# Patient Record
Sex: MALE | Race: WHITE | URBAN - METROPOLITAN AREA
[De-identification: names, ages, dates, MRNs, and addresses within clinical notes are randomized per-mention and may not be internally consistent; named-entity substitution may affect disease eponyms.]

---

## 2018-07-17 ENCOUNTER — TRANSFERRED RECORDS (OUTPATIENT)
Dept: HEALTH INFORMATION MANAGEMENT | Facility: CLINIC | Age: 68
End: 2018-07-17

## 2019-05-13 ENCOUNTER — MEDICAL CORRESPONDENCE (OUTPATIENT)
Dept: HEALTH INFORMATION MANAGEMENT | Facility: CLINIC | Age: 69
End: 2019-05-13

## 2019-05-13 ENCOUNTER — TRANSFERRED RECORDS (OUTPATIENT)
Dept: HEALTH INFORMATION MANAGEMENT | Facility: CLINIC | Age: 69
End: 2019-05-13

## 2019-05-15 ENCOUNTER — TRANSFERRED RECORDS (OUTPATIENT)
Dept: HEALTH INFORMATION MANAGEMENT | Facility: CLINIC | Age: 69
End: 2019-05-15

## 2019-05-27 ENCOUNTER — TRANSFERRED RECORDS (OUTPATIENT)
Dept: HEALTH INFORMATION MANAGEMENT | Facility: CLINIC | Age: 69
End: 2019-05-27

## 2019-05-28 ENCOUNTER — TELEPHONE (OUTPATIENT)
Dept: OPHTHALMOLOGY | Facility: CLINIC | Age: 69
End: 2019-05-28

## 2019-05-28 NOTE — TELEPHONE ENCOUNTER
NPI for Dr. Garcia provided to [facility]/clinic  Lam Naylor RN 12:28 PM 05/28/19       Health Call Center    Phone Message    May a detailed message be left on voicemail: yes    Reason for Call: Other: Diana is needing Toy Leavitt NPI# for billing purpose. Please call her back with the NPI#, thank you     Action Taken: Message routed to:  [facility] ([facility] Eye

## 2019-06-13 ENCOUNTER — TRANSFERRED RECORDS (OUTPATIENT)
Dept: HEALTH INFORMATION MANAGEMENT | Facility: CLINIC | Age: 69
End: 2019-06-13

## 2019-06-26 ENCOUNTER — OFFICE VISIT (OUTPATIENT)
Dept: OPHTHALMOLOGY | Facility: CLINIC | Age: 69
End: 2019-06-26
Attending: OPHTHALMOLOGY
Payer: COMMERCIAL

## 2019-06-26 DIAGNOSIS — H53.10 SUBJECTIVE VISUAL DISTURBANCE: ICD-10-CM

## 2019-06-26 DIAGNOSIS — H50.05 ESOTROPIA, ALTERNATING: Primary | ICD-10-CM

## 2019-06-26 DIAGNOSIS — H53.2 DOUBLE VISION: ICD-10-CM

## 2019-06-26 PROCEDURE — 92060 SENSORIMOTOR EXAMINATION: CPT | Mod: ZF | Performed by: OPHTHALMOLOGY

## 2019-06-26 PROCEDURE — G0463 HOSPITAL OUTPT CLINIC VISIT: HCPCS | Mod: ZF

## 2019-06-26 ASSESSMENT — EXTERNAL EXAM - RIGHT EYE: OD_EXAM: NORMAL

## 2019-06-26 ASSESSMENT — REFRACTION_WEARINGRX
OS_CYLINDER: +2.50
OS_SPHERE: +2.00
OD_ADD: +2.25
OD_AXIS: 166
OS_AXIS: 165
OS_HPRISM: 9 BO
OD_CYLINDER: +2.25
OS_ADD: +2.25
OD_HPRISM: 9 BO
OD_SPHERE: +2.50

## 2019-06-26 ASSESSMENT — VISUAL ACUITY
OS_CC: 20/20
OS_CC+: -1
OD_CC: 20/20
CORRECTION_TYPE: GLASSES
METHOD: SNELLEN - LINEAR

## 2019-06-26 ASSESSMENT — CUP TO DISC RATIO
OD_RATIO: 0.25
OS_RATIO: 0.25

## 2019-06-26 ASSESSMENT — TONOMETRY
IOP_METHOD: ICARE
OS_IOP_MMHG: 15
OD_IOP_MMHG: 16

## 2019-06-26 ASSESSMENT — CONF VISUAL FIELD
OD_NORMAL: 1
OS_NORMAL: 1

## 2019-06-26 ASSESSMENT — EXTERNAL EXAM - LEFT EYE: OS_EXAM: NORMAL

## 2019-06-26 NOTE — LETTER
2019     Michael Murray MD    RE: Abdifatah Gold  : 1950  MRN: [id number]    Dear Dr. Murray:    Thank you for referring your patient, Abdifatah Das, to [facility] clinic recently.  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 Discussed the risks, benefits, and alternatives of strabismus surgery and patient wishes to proceed.    Abdifatah Gold is a 68-year-old male [facility] for progressive binocular horizontal diplopia due to esotropia. Onset was . Worse at distance than near and worse later in day/when tired. Has had increasing prism requirements, currently tolerating 18 total LEOBARDO (9 each eye) in current spectacles.    PMH includes HLD and CAD (NSTEMI) status post stent. On lipitor and metoprolol and baby aspirin once daily.  Non-smoker.    [facility] brain with [facility] ([facility], [facility])  1. Age-appropriate involutional changes; trace pontine chronic microvascular disease on the left.  Focus of right frontal operculum compatible with cavernoma.  In proper clinical setting amyloid angiopathy and other hemorrhagic lesions could have similar appearance.  No intracranial lesions.    On exam, patient is 20/20 each eye with briskly reactive pupils without RAPD. IOP, CVF, and color plates all normal. With trial frames without prisms patient has a comitant esotropia of 25 PD at distance and an ET of 16 PD at near. Anterior segment exam is notable for a deep sulcus and mild cataracts bilaterally. Posterior segment exam is unremarkable.    In summary, Mr. Gold has a comitant esotropia with progressive prism requirements over the past 5 years. 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 Due to intolerable prism requirements, strabismus surgery is a very reasonable  option in this case.  The patient is highly motivated for strabismus surgery to reduce or potentially eliminate his prism requirement.     We discussed in detail the risks, benefits, and alternatives of eye muscle correction surgery including the very rare risk of death or serious morbidity from a general anesthesia complication and the rare risk of severe vision loss in the operative eye(s) secondary to retinal detachment or endophthalmitis.  We discussed more likely sub-optimal outcomes including the unanticipated need for additional strabismus surgery.  Finally the patient was aware that prisms glasses may be required to optimize single vision following surgery.    After a thorough discussion of these risks, the patient decided to proceed with strabismus surgery.  The surgical plan is as follows:     1. Bilateral lateral rectus resections - one or both on adjustable suture     Follow-up 1 week after strabismus surgery    Plan [facility]  Prism-free glasses for adjustment: [facility]    Again, thank you for trusting me with the care of your patient.  For further exam details, please feel free to contact our office for additional records.  If you wish to contact me regarding this patient please email me at [email or web address] or give my clinic a call to arrange a phone conversation.    Sincerely,    Toy Martinez MD  , Neuro-Ophthalmology and Adult Strabismus Surgery  The Zita VALE and Tammi Stubbs Chair in Neuro-Ophthalmology  Department of Ophthalmology and Visual Neurosciences  [facility]        CC:  [facility]

## 2019-06-26 NOTE — PROGRESS NOTES
1. 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 Discussed the risks, benefits, and alternatives of strabismus surgery and patient wishes to proceed.    Abdifatah Gold is a 68 year old male [facility] ([facility]) for progressive binocular horizontal diplopia due to esotropia. Onset was 2011. Worse at distance than near and worse later in day/when tired. Has had increasing prism requirements, currently tolerating 18 total LEOBARDO (9 each eye) in current spectacles.    PMH includes HLD and CAD (NSTEMI) status post stent. On lipitor and metoprolol and baby aspirin once daily.  Non-smoker.    MRI brain with and without contrast 6/13/19 ([facility])  1. Age-appropriate involutional changes; trace pontine chronic microvascular disease on the left  Focus of right frontal operculum compatible with cavernoma  In proper clinical setting amyloid angiopathy and other hemorrhagic lesions could have similar appearance  No intracranial lesions.    On exam, patient is 20/20 each eye with briskly reactive pupils without RAPD. IOP, CVF, and color plates all normal. With trial frames without prisms patient has a comitant esotropia of 25 PD at distance and an ET of 16 PD at near. Anterior segment exam is notable for a deep sulcus and mild cataracts bilaterally. Posterior segment exam is unremarkable.    In summary, Mr. Gold has a comitant esotropia with progressive prism requirements over the past 5 years. 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 Due to intolerable prism requirements, strabismus surgery is a very reasonable option in this case.  The patient is highly motivated for strabismus surgery to reduce or potentially eliminate his prism requirement.     We discussed in detail the risks, benefits, and alternatives of eye muscle correction surgery including the very rare risk of death or serious morbidity from a general  anesthesia complication and the rare risk of severe vision loss in the operative eye(s) secondary to retinal detachment or endophthalmitis.  We discussed more likely sub-optimal outcomes including the unanticipated need for additional strabismus surgery.  Finally the patient was aware that prisms glasses may be required to optimize single vision following surgery.    After a thorough discussion of these risks, the patient decided to proceed with strabismus surgery.  The surgical plan is as follows:     1. Bilateral lateral rectus resections - one or both on adjustable suture     Follow-up 1 week after strabismus surgery.    [facility]: [facility]  Prism free glasses for adjustment: [facility]     Complete documentation of historical and exam elements from today's encounter can be found in the full encounter summary report (not reduplicated in this progress note).  I personally obtained the chief complaint(s) and history of present illness.  I confirmed and edited as necessary the review of systems, past medical/surgical history, family history, social history, and examination findings as documented by others; and I examined the patient myself.  I personally reviewed the relevant tests, images, and reports as documented above.  I formulated and edited as necessary the assessment and plan and discussed the findings and management plan with the patient and family.  I personally reviewed the ophthalmic test(s) associated with this encounter, agree with the interpretation(s) as documented by the resident/fellow, and have edited the corresponding report(s) as necessary.     MD Rehan House M.D.  PGY-3, Ophthalmology

## 2019-06-26 NOTE — NURSING NOTE
Chief Complaints and History of Present Illnesses   Patient presents with     Diplopia Evaluation     Chief Complaint(s) and History of Present Illness(es)     Diplopia Evaluation               Comments     Abdifatah Gold is a 68 year old male who presents today for  1. Progressive esotropia  Onset in 2011. Getting worse. Intermittent diplopia, only when tired. Worse at distance than near.     Currently wearing rx with inc prisms. Interested in surgery if necessary.   MR brain was unremarkable per patient account.     Myah ALBARRAN 12:32 PM June 26, 2019

## 2019-08-09 ENCOUNTER — TRANSFERRED RECORDS (OUTPATIENT)
Dept: HEALTH INFORMATION MANAGEMENT | Facility: CLINIC | Age: 69
End: 2019-08-09

## 2019-08-13 ENCOUNTER — TELEPHONE (OUTPATIENT)
Dept: OPHTHALMOLOGY | Facility: CLINIC | Age: 69
End: 2019-08-13

## 2019-08-13 NOTE — TELEPHONE ENCOUNTER
8/13/2019 2:33PM Advised that H&P has not been received in our office or in the [facility] office. [facility] [place] They will contact Dr. Henry's office and request H&P be faxed to [facility] office at [phone].    8/13/2019 2:11PM Ying marte H[facility] was completed by [facility]. They would like to confirm that it has been received for 8/13/2019 surgery.

## 2019-08-15 RX ORDER — MULTIVIT-MIN/IRON/FOLIC ACID/K 18-600-40
2 CAPSULE ORAL DAILY
COMMUNITY

## 2019-08-15 RX ORDER — ASPIRIN 81 MG/1
81 TABLET, CHEWABLE ORAL DAILY
COMMUNITY

## 2019-08-15 RX ORDER — MULTIPLE VITAMINS W/ MINERALS TAB 9MG-400MCG
1 TAB ORAL DAILY
COMMUNITY

## 2019-08-15 RX ORDER — SILDENAFIL 100 MG/1
100 TABLET, FILM COATED ORAL DAILY PRN
COMMUNITY

## 2019-08-15 RX ORDER — TRAMADOL HYDROCHLORIDE 50 MG/1
50 TABLET ORAL 2 TIMES DAILY PRN
COMMUNITY

## 2019-08-15 RX ORDER — ATORVASTATIN CALCIUM 80 MG/1
80 TABLET, FILM COATED ORAL AT BEDTIME
COMMUNITY

## 2019-08-15 RX ORDER — GABAPENTIN 300 MG/1
300 CAPSULE ORAL 3 TIMES DAILY
COMMUNITY

## 2019-08-15 SDOH — HEALTH STABILITY: MENTAL HEALTH: HOW OFTEN DO YOU HAVE A DRINK CONTAINING ALCOHOL?: NEVER

## 2019-08-16 ENCOUNTER — ANESTHESIA EVENT (OUTPATIENT)
Dept: SURGERY | Facility: AMBULATORY SURGERY CENTER | Age: 69
End: 2019-08-16

## 2019-08-19 ENCOUNTER — HOSPITAL ENCOUNTER (OUTPATIENT)
Facility: AMBULATORY SURGERY CENTER | Age: 69
End: 2019-08-19
Attending: OPHTHALMOLOGY

## 2019-08-19 ENCOUNTER — ANESTHESIA (OUTPATIENT)
Dept: SURGERY | Facility: AMBULATORY SURGERY CENTER | Age: 69
End: 2019-08-19

## 2019-08-19 VITALS
SYSTOLIC BLOOD PRESSURE: 125 MMHG | HEART RATE: 66 BPM | OXYGEN SATURATION: 99 % | RESPIRATION RATE: 16 BRPM | BODY MASS INDEX: 25.11 KG/M2 | WEIGHT: 160 LBS | TEMPERATURE: 97.8 F | HEIGHT: 67 IN | DIASTOLIC BLOOD PRESSURE: 80 MMHG

## 2019-08-19 DIAGNOSIS — Z98.890 STATUS POST EYE SURGERY: Primary | ICD-10-CM

## 2019-08-19 RX ORDER — PROPOFOL 10 MG/ML
INJECTION, EMULSION INTRAVENOUS CONTINUOUS PRN
Status: DISCONTINUED | OUTPATIENT
Start: 2019-08-19 | End: 2019-08-19

## 2019-08-19 RX ORDER — NALOXONE HYDROCHLORIDE 0.4 MG/ML
.1-.4 INJECTION, SOLUTION INTRAMUSCULAR; INTRAVENOUS; SUBCUTANEOUS
Status: DISCONTINUED | OUTPATIENT
Start: 2019-08-19 | End: 2019-08-20 | Stop reason: HOSPADM

## 2019-08-19 RX ORDER — SODIUM CHLORIDE, SODIUM LACTATE, POTASSIUM CHLORIDE, CALCIUM CHLORIDE 600; 310; 30; 20 MG/100ML; MG/100ML; MG/100ML; MG/100ML
INJECTION, SOLUTION INTRAVENOUS CONTINUOUS PRN
Status: DISCONTINUED | OUTPATIENT
Start: 2019-08-19 | End: 2019-08-19

## 2019-08-19 RX ORDER — FENTANYL CITRATE 50 UG/ML
25-50 INJECTION, SOLUTION INTRAMUSCULAR; INTRAVENOUS
Status: DISCONTINUED | OUTPATIENT
Start: 2019-08-19 | End: 2019-08-20 | Stop reason: HOSPADM

## 2019-08-19 RX ORDER — MEPERIDINE HYDROCHLORIDE 25 MG/ML
12.5 INJECTION INTRAMUSCULAR; INTRAVENOUS; SUBCUTANEOUS
Status: DISCONTINUED | OUTPATIENT
Start: 2019-08-19 | End: 2019-08-20 | Stop reason: HOSPADM

## 2019-08-19 RX ORDER — PREDNISOLONE ACETATE 10 MG/ML
1-2 SUSPENSION/ DROPS OPHTHALMIC 4 TIMES DAILY
Qty: 1 BOTTLE | Refills: 1 | Status: SHIPPED | OUTPATIENT
Start: 2019-08-19

## 2019-08-19 RX ORDER — OXYMETAZOLINE HYDROCHLORIDE 0.05 G/100ML
SPRAY NASAL PRN
Status: DISCONTINUED | OUTPATIENT
Start: 2019-08-19 | End: 2019-08-19 | Stop reason: HOSPADM

## 2019-08-19 RX ORDER — BALANCED SALT SOLUTION 6.4; .75; .48; .3; 3.9; 1.7 MG/ML; MG/ML; MG/ML; MG/ML; MG/ML; MG/ML
SOLUTION OPHTHALMIC PRN
Status: DISCONTINUED | OUTPATIENT
Start: 2019-08-19 | End: 2019-08-19 | Stop reason: HOSPADM

## 2019-08-19 RX ORDER — SODIUM CHLORIDE, SODIUM LACTATE, POTASSIUM CHLORIDE, CALCIUM CHLORIDE 600; 310; 30; 20 MG/100ML; MG/100ML; MG/100ML; MG/100ML
INJECTION, SOLUTION INTRAVENOUS CONTINUOUS
Status: DISCONTINUED | OUTPATIENT
Start: 2019-08-19 | End: 2019-08-20 | Stop reason: HOSPADM

## 2019-08-19 RX ORDER — ONDANSETRON 2 MG/ML
INJECTION INTRAMUSCULAR; INTRAVENOUS PRN
Status: DISCONTINUED | OUTPATIENT
Start: 2019-08-19 | End: 2019-08-19

## 2019-08-19 RX ORDER — OXYCODONE HYDROCHLORIDE 5 MG/1
5 TABLET ORAL EVERY 4 HOURS PRN
Status: DISCONTINUED | OUTPATIENT
Start: 2019-08-19 | End: 2019-08-20 | Stop reason: HOSPADM

## 2019-08-19 RX ORDER — DEXAMETHASONE SODIUM PHOSPHATE 4 MG/ML
INJECTION, SOLUTION INTRA-ARTICULAR; INTRALESIONAL; INTRAMUSCULAR; INTRAVENOUS; SOFT TISSUE PRN
Status: DISCONTINUED | OUTPATIENT
Start: 2019-08-19 | End: 2019-08-19

## 2019-08-19 RX ORDER — PROPOFOL 10 MG/ML
INJECTION, EMULSION INTRAVENOUS PRN
Status: DISCONTINUED | OUTPATIENT
Start: 2019-08-19 | End: 2019-08-19

## 2019-08-19 RX ORDER — LIDOCAINE HYDROCHLORIDE 20 MG/ML
INJECTION, SOLUTION INFILTRATION; PERINEURAL PRN
Status: DISCONTINUED | OUTPATIENT
Start: 2019-08-19 | End: 2019-08-19

## 2019-08-19 RX ORDER — ACETAMINOPHEN 325 MG/1
975 TABLET ORAL ONCE
Status: COMPLETED | OUTPATIENT
Start: 2019-08-19 | End: 2019-08-19

## 2019-08-19 RX ORDER — TETRACAINE HYDROCHLORIDE 5 MG/ML
1-2 SOLUTION OPHTHALMIC
Status: DISCONTINUED | OUTPATIENT
Start: 2019-08-19 | End: 2019-08-20 | Stop reason: HOSPADM

## 2019-08-19 RX ORDER — ONDANSETRON 2 MG/ML
4 INJECTION INTRAMUSCULAR; INTRAVENOUS EVERY 30 MIN PRN
Status: DISCONTINUED | OUTPATIENT
Start: 2019-08-19 | End: 2019-08-20 | Stop reason: HOSPADM

## 2019-08-19 RX ORDER — TETRACAINE HYDROCHLORIDE 5 MG/ML
1-2 SOLUTION OPHTHALMIC
Status: DISCONTINUED | OUTPATIENT
Start: 2019-08-19 | End: 2019-08-19

## 2019-08-19 RX ORDER — FENTANYL CITRATE 50 UG/ML
INJECTION, SOLUTION INTRAMUSCULAR; INTRAVENOUS PRN
Status: DISCONTINUED | OUTPATIENT
Start: 2019-08-19 | End: 2019-08-19

## 2019-08-19 RX ORDER — GABAPENTIN 100 MG/1
100 CAPSULE ORAL ONCE
Status: DISCONTINUED | OUTPATIENT
Start: 2019-08-19 | End: 2019-08-20 | Stop reason: HOSPADM

## 2019-08-19 RX ORDER — ONDANSETRON 4 MG/1
4 TABLET, ORALLY DISINTEGRATING ORAL EVERY 30 MIN PRN
Status: DISCONTINUED | OUTPATIENT
Start: 2019-08-19 | End: 2019-08-20 | Stop reason: HOSPADM

## 2019-08-19 RX ADMIN — DEXAMETHASONE SODIUM PHOSPHATE 4 MG: 4 INJECTION, SOLUTION INTRA-ARTICULAR; INTRALESIONAL; INTRAMUSCULAR; INTRAVENOUS; SOFT TISSUE at 07:28

## 2019-08-19 RX ADMIN — SODIUM CHLORIDE, SODIUM LACTATE, POTASSIUM CHLORIDE, CALCIUM CHLORIDE: 600; 310; 30; 20 INJECTION, SOLUTION INTRAVENOUS at 06:45

## 2019-08-19 RX ADMIN — PROPOFOL 200 MCG/KG/MIN: 10 INJECTION, EMULSION INTRAVENOUS at 07:23

## 2019-08-19 RX ADMIN — ACETAMINOPHEN 975 MG: 325 TABLET ORAL at 06:30

## 2019-08-19 RX ADMIN — TETRACAINE HYDROCHLORIDE 2 DROP: 5 SOLUTION OPHTHALMIC at 09:07

## 2019-08-19 RX ADMIN — SODIUM CHLORIDE, SODIUM LACTATE, POTASSIUM CHLORIDE, CALCIUM CHLORIDE: 600; 310; 30; 20 INJECTION, SOLUTION INTRAVENOUS at 06:30

## 2019-08-19 RX ADMIN — PROPOFOL 140 MG: 10 INJECTION, EMULSION INTRAVENOUS at 07:23

## 2019-08-19 RX ADMIN — LIDOCAINE HYDROCHLORIDE 70 MG: 20 INJECTION, SOLUTION INFILTRATION; PERINEURAL at 07:23

## 2019-08-19 RX ADMIN — FENTANYL CITRATE 50 MCG: 50 INJECTION, SOLUTION INTRAMUSCULAR; INTRAVENOUS at 07:23

## 2019-08-19 RX ADMIN — ONDANSETRON 4 MG: 2 INJECTION INTRAMUSCULAR; INTRAVENOUS at 08:05

## 2019-08-19 RX ADMIN — PROPOFOL 20 MG: 10 INJECTION, EMULSION INTRAVENOUS at 08:01

## 2019-08-19 ASSESSMENT — MIFFLIN-ST. JEOR: SCORE: 1449.39

## 2019-08-19 ASSESSMENT — LIFESTYLE VARIABLES: TOBACCO_USE: 1

## 2019-08-19 NOTE — ANESTHESIA CARE TRANSFER NOTE
Patient: Abdifatah Gold    Procedure(s):  Bilateral Strabismus Repair, Use of Adjustable Suture in Left Eye    Diagnosis: Strabismus  Diagnosis Additional Information: No value filed.    Anesthesia Type:   General     Note:    Patient transferred to:PACU  Handoff Report: Identifed the Patient, Identified the Reponsible Provider, Reviewed the pertinent medical history, Discussed the surgical course, Reviewed Intra-OP anesthesia mangement and issues during anesthesia, Set expectations for post-procedure period and Allowed opportunity for questions and acknowledgement of understanding      Vitals: (Last set prior to Anesthesia Care Transfer)    CRNA VITALS  8/19/2019 0750 - 8/19/2019 0827      8/19/2019             Resp Rate (set):  10                Electronically Signed By: ANGELO FRANCIS CRNA  August 19, 2019  8:27 AM

## 2019-08-19 NOTE — BRIEF OP NOTE
[facility] Brief Operative Note    Pre-operative diagnosis: Strabismus   Post-operative diagnosis Same   Procedure: Procedure(s):  Bilateral Strabismus Repair, Use of Adjustable Suture in Left Eye   Surgeon: Toy Garcia MD   Assistants(s): None   Estimated blood loss: Less than 1 cc    Specimens: None   Findings: See full operative report

## 2019-08-19 NOTE — ANESTHESIA POSTPROCEDURE EVALUATION
Anesthesia POST Procedure Evaluation    Patient: Abdifatah Gold   MRN:     [id number] Gender:   male   Age:    68 year old :      1950        Preoperative Diagnosis: Strabismus   Procedure(s):  Bilateral Strabismus Repair, Use of Adjustable Suture in Left Eye   Postop Comments: No value filed.       Anesthesia Type:  Not documented  General    Reportable Event: NO     PAIN: Uncomplicated   Sign Out status: Comfortable, Well controlled pain     PONV: No PONV   Sign Out status:  No Nausea or Vomiting     Neuro/Psych: Uneventful perioperative course   Sign Out Status: Preoperative baseline; Age appropriate mentation     Airway/Resp.: Uneventful perioperative course   Sign Out Status: Non labored breathing, age appropriate RR; Resp. Status within EXPECTED Parameters     CV: Uneventful perioperative course   Sign Out status: Appropriate BP and perfusion indices; Appropriate HR/Rhythm     Disposition:   Sign Out in:  PACU  Disposition:  Phase II; Home  Recovery Course: Uneventful  Follow-Up: Not required           Last Anesthesia Record Vitals:  CRNA VITALS  2019 0750 - 2019 0850      2019             Resp Rate (set):  10          Last PACU Vitals:  Vitals Value Taken Time   /75 2019  8:45 AM   Temp 36.6  C (97.8  F) 2019  8:45 AM   Pulse 64 2019  8:45 AM   Resp 13 2019  8:45 AM   SpO2 100 % 2019  8:45 AM   Temp src     NIBP     Pulse     SpO2     Resp     Temp     Ht Rate     Temp 2           Electronically Signed By: Lai Whitley DO, 2019, 10:19 AM

## 2019-08-19 NOTE — OP NOTE
"ATTENDING SURGEON:  Toy Martinez MD    DATE OF PROCEDURE:  August 19, 2019    FIRST SURGICAL ASSISTANT:  None    ANESTHESIA:  General anesthesia    PREOPERATIVE DIAGNOSIS (ES):  1. Alternating esotropia     POSTOPERATIVE DIAGNOSIS (ES):  Same    NAME OF OPERATION:  1. Right lateral rectus resection 7.0 mm  2. Left lateral rectus resection 7.5 mm on adjustable suture     INDICATIONS FOR PROCEDURE:    The patient is a pleasant 68 year old male with a past ocular history of alternating esotropia and binocular diplopia with horizontal images since at least 2011.  He has had increasing prism requirements since that time and recently he has become unable to tolerate the amount of prism he would require.  He was highly motivated for strabismus surgery to allow him single binocular vision with or without a tolerable amount of prism in his glasses.    I warned the patient about the risks, benefits, and alternatives of eye muscle surgery including a relatively small risk for severe infection, retinal detachment, and permanent vision loss of the eye.  I also discussed the possibility of postoperative double vision.  I discussed the possibility that due to postoperative double vision or a postoperative recurrent strabismus, the patient may require additional strabismus procedures in the future.  Understanding these risks, the patient decided to proceed with strabismus surgery.      DESCRIPTION OF PROCEDURE:    After the risks, benefits, and alternatives of eye muscle surgery were discussed with the patient and the patient's questions were answered both in clinic and on the day of surgery, written informed consent was obtained and witnessed in the preoperative holding area on the day of surgery.  The word \"yes\" was written over both eyes indicating that the patient consented to eye muscle correction in both eyes.  An intravenous line was placed and light intravenous sedation was given.  The patient was transported to the " operating room where after appropriate monitors were placed, the patient underwent induction of general anesthesia without complication.      Both eyes were prepped and draped in the usual sterile fashion for ophthalmic surgery and a lid speculum was placed in the right eye.  Forced duction testing in this eye revealed no restriction.  A trapezoidal temporal conjunctival flap was created using conjunctival forceps and Jocelyn scissors.  This was reflected backwards to expose the right lateral rectus muscle which was isolated using a Pendergrass muscle hook.  The muscle was freed from Tenon's capsule with blunt dissection using a Jocelyn scissors and cotton swab.  A double armed 6-0 Vicryl suture was then woven across the width of the muscle at a point measured to be 7.0 mm posterior to the insertion and tied to the edges.  A hemostat straight clamp was then clamped perpendicular to the muscle just anterior to the suture and the distal 6.5 mm muscle segment was excised with a Jocelyn scissors and 0.3 forceps.  Both arms of the 6-0 Vicryl suture were then passed partial thickness through the sclera in a crossing swords technique at the physiologic insertion site.  At this point, the ends of the suture were tied together tightly advancing the muscle and completing a resection effect of 7.0 mm.  The needles were cut off and the ends were cut short.  The conjunctival flap was then re-opposed in the surgical bed and held in place using two 6-0 plain gut sutures.  The lid speculum was removed from the operative eye.     A lid speculum was placed in the left eye.  Forced duction testing in this eye revealed no restriction.  A trapezoidal temporal conjunctival flap was created using conjunctival forceps and Jocelyn scissors.  This was reflected backwards to expose the left lateral rectus muscle which was isolated using a Malcolm muscle hook.  The muscle was freed from Tenon's capsule with blunt dissection using a Jocelyn  scissors and cotton swab.  A double armed 6-0 Vicryl suture was then woven across the width of the muscle at a point measured to be 7.5 mm posterior to the insertion and tied to the edges.  A hemostat straight clamp was then clamped perpendicular to the muscle just anterior to the suture and the distal 7.0 mm muscle segment was excised with a Jocelyn scissors and 0.3 forceps.  Both arms of the 6-0 Vicryl suture were then passed partial thickness through the sclera in a crossing swords technique at the physiologic insertion site.  At this point, a 6-0 undyed Vicryl suture with needle removed was used to create a sliding  noose  knot allowing for post-operative adjustment.  A 6-0 Vicryl suture on a spatulated needle was then used to make a partial-thickness scleral bite adjacent to the operative muscle physiologic insertion to serve as a traction suture facilitating the adjustment process.  The needles were then cut off of the double armed 6-0 pole sutures.  The proximal portion of the operative muscle was then fully advanced completing the resection effect of 7.5 mm.  Viscoelastic agent was then used to provide lubrication to the noose knot so that it would slide freely.  The trapezoidal conjunctival flap was reopposed in the surgical bed and held in place with 6-0 plain gut suture using one permanent suture and one temporary suture (which would be made permanent following adjustment post-operartively).The lid speculum was removed from the operative eye.     Steri-Strips were used to hold down the free ends of the four sutures used for adjustment.    Maxitrol ointment was placed in both eyes.  The patient was awakened from general anesthesia without complication and discharged to the recovery room in stable condition.       SPECIMENS REMOVED:  None.      ESTIMATED BLOOD LOSS:  1 mL or less.    INTRAOPERATIVE FLUIDS:  As per anesthesia records.      SPONGE/INSTRUMENT/NEEDLE COUNTS:  All sponge, instrument, and needle  counts were correct times two.    CONDITION ON DISCHARGE FROM OPERATING ROOM:  Stable.      COMPLICATIONS:  None.     I was present for the entire procedure    POST-OPERATIVE ADJUSTMENT    After awakening from anesthesia sufficiently to communicate clearly and fixate on a target consistent, the adjustment process began.  Tetracaine topical anesthetic was placed in both eyes and steri-strips holding down the temporary sutures were removed from the face.      Alignment measurements were obtained and showed a 20 prism diopter consecutive exotropia.  The left lateral rectus was recessed an approximate 5 mm through a series of adjustments at which point the patient had an 8 prism diopter consecutive exophoria that the patient could fuse.    All sutures were then tied off and ends were cut short.  The adjustment process took an estimated 20 minutes to complete.

## 2019-08-19 NOTE — ANESTHESIA PREPROCEDURE EVALUATION
Anesthesia Pre-Procedure Evaluation    Patient: Abdifatah Gold   MRN:     [id number] Gender:   male   Age:    68 year old :      1950        Preoperative Diagnosis: Strabismus   Procedure(s):  Bilateral Strabismus Repair Possible Use of Adjustable Suture in One or Both Eyes     History reviewed. No pertinent past medical history.   Past Surgical History:   Procedure Laterality Date     ORTHOPEDIC SURGERY      lumbar fusion          Anesthesia Evaluation     . Pt has had prior anesthetic.     No history of anesthetic complications          ROS/MED HX    ENT/Pulmonary:     (+)sleep apnea, tobacco use, Past use uses CPAP , . .    Neurologic:  - neg neurologic ROS     Cardiovascular:     (+) Dyslipidemia, ----stent,2015  . : . . . :. . No previous cardiac testing       METS/Exercise Tolerance:  >4 METS   Hematologic:  - neg hematologic  ROS       Musculoskeletal:  - neg musculoskeletal ROS       GI/Hepatic:  - neg GI/hepatic ROS       Renal/Genitourinary:  - ROS Renal section negative       Endo:  - neg endo ROS       Psychiatric:     (+) psychiatric history Psychiatric Hx List: PTSD.      Infectious Disease:  - neg infectious disease ROS       Malignancy:      - no malignancy   Other:    - neg other ROS                     PHYSICAL EXAM:   Mental Status/Neuro: A/A/O   Airway: Facies: Feasible  Mallampati: I  Mouth/Opening: Full  TM distance: > 6 cm  Neck ROM: Full   Respiratory: Auscultation: CTAB     Resp. Rate: Normal     Resp. Effort: Normal      CV: Rhythm: Regular  Rate: Age appropriate  Heart: Normal Sounds  Edema: None   Comments:      Dental: Normal Dentition                LABS:  CBC: No results found for: WBC, HGB, HCT, PLT  BMP: No results found for: NA, POTASSIUM, CHLORIDE, CO2, BUN, CR, GLC  COAGS: No results found for: PTT, INR, FIBR  POC: No results found for: BGM, HCG, HCGS  OTHER: No results found for: PH, LACT, A1C, BJ, PHOS, MAG, ALBUMIN, PROTTOTAL, ALT, AST, GGT, ALKPHOS, BILITOTAL,  "BILIDIRECT, LIPASE, AMYLASE, PAULA, TSH, T4, T3, CRP, SED     Preop Vitals    BP Readings from Last 3 Encounters:   08/19/19 (!) 154/81    Pulse Readings from Last 3 Encounters:   08/19/19 61      Resp Readings from Last 3 Encounters:   08/19/19 16    SpO2 Readings from Last 3 Encounters:   08/19/19 100%      Temp Readings from Last 1 Encounters:   08/19/19 36.5  C (97.7  F) (Oral)    Ht Readings from Last 1 Encounters:   08/19/19 1.702 m (5' 7\")      Wt Readings from Last 1 Encounters:   08/19/19 72.6 kg (160 lb)    Estimated body mass index is 25.06 kg/m  as calculated from the following:    Height as of this encounter: 1.702 m (5' 7\").    Weight as of this encounter: 72.6 kg (160 lb).     LDA:        Assessment:   ASA SCORE: 3    H&P: History and physical reviewed and following examination; no interval change.   Smoking Status:  Non-Smoker/Unknown   NPO Status: NPO Appropriate     Plan:   Anes. Type:  General   Pre-Medication: None   Induction:  IV (Standard)   Airway: LMA   Access/Monitoring: PIV   Maintenance: Balanced     Postop Plan:   Postop Pain: None  Postop Sedation/Airway: Not planned  Disposition: Outpatient     PONV Management:   Adult Risk Factors:, Non-Smoker   Prevention: Ondansetron     CONSENT: Direct conversation   Plan and risks discussed with: Patient   Blood Products: Consent Deferred (Minimal Blood Loss)                   Lai Whitley,   "

## 2019-08-19 NOTE — DISCHARGE INSTRUCTIONS
[facility]  Home Care Following Anesthesia  For 24 hours after surgery:  1. Get plenty of rest.  A responsible adult must stay with you for at least 24 hours after you leave the surgery center.  2. Do not drive or use heavy equipment.  If you have weakness or tingling, don't drive or use heavy equipment until this feeling goes away.   3. Do not drink alcohol.   4. Avoid strenuous or risky activities.  Ask for help when climbing stairs.  5. You may feel lightheaded.  IF so, sit for a few minutes before standing.  Have someone help you get up.   6. If you have nausea (feel sick to your stomach): Drink only clear liquids such as apple juice, ginger ale, broth or 7-Up.  Rest may also help.  Be sure to drink enough fluids.  Move to a regular diet as you feel able.   7. You may have a slight fever.  Call the doctor if your fever is over 100 F (37.7 C) (taken under the tongue) or lasts longer than 24 hours.  8. You may have a dry mouth, a sore throat, muscle aches or trouble sleeping. These should go away after 24 hours.  9. Do not make important or legal decisions.         Tips for taking pain medications  To get the best pain relief possible, remember these points:    Take pain medications as directed, before pain becomes severe.    Pain medication can upset your stomach: taking it with food may help.    Constipation is a common side effect of pain medication. Drink plenty of  fluids.    Eat foods high in fiber. Take a stool softener if recommended by your doctor or pharmacist.    Do not drink alcohol, drive or operate machinery while taking pain medications.    Ask about other ways to control pain, such as with heat, ice or relaxation.    Tylenol/Acetaminophen Consumption  To help encourage the safe use of acetaminophen, the makers of TYLENOL  have lowered the maximum daily dose for single-ingredient Extra Strength TYLENOL  (acetaminophen) products sold in the [place] from 8 pills per  day (4,000 mg) to 6 pills per day (3,000 mg). The dosing interval has also changed from 2 pills every 4-6 hours to 2 pills every 6 hours.    If you feel your pain relief is insufficient, you may take Tylenol/Acetaminophen in addition to your narcotic pain medication.     Be careful not to exceed 3,000 mg of Tylenol/Acetaminophen in a 24 hour period from all sources.    If you are taking extra strength Tylenol/acetaminophen (500 mg), the maximum dose is 6 tablets in 24 hours.    If you are taking regular strength acetaminophen (325 mg), the maximum dose is 9 tablets in 24 hours.    Call a doctor for any of the followin. Signs of infection (fever, growing tenderness at the surgery site, a large amount of drainage or bleeding, severe pain, foul-smelling drainage, redness, swelling).  2. It has been over 8 to 10 hours since surgery and you are still not able to urinate (pass water).  3. Headache for over 24 hours.  4. Numbness, tingling or weakness the day after surgery (if you had spinal anesthesia).  Your doctor is:  ***  Dr. Toy Martinez, Ophthalmology: [phone]               Or dial [phone] and ask for the resident on call for:  Ophthalmology  For emergency care, call the:  [facility]:  [phone] (TTY for hearing impaired: [phone])  Instructions for after your eye muscle surgery:    Instill 1 cm of Maxitrol ophthalmic ointment in the operative eye(s) in 3 times per day until follow-up with Dr. Martinez in about 1 week.  The ointment is expected to blur vision but is necessary.      You will also go home with a prescription for a steroid eye drop. Do NOT start this eye drop yet.  When you see Dr. Martinez at your post-operative visit he will tell you if and when to start the drops.    Avoid all eye pressure or trauma for 7 days.  No eye rubbing, straining, swimming, athletics, or outdoor activities in which dirt or foreign objects could enter the eye.      ok to take  a bath and shower immediately but don t run shower water on face.      Tylenol or ibuprofen over the counter may be used for pain.  Pain can occasionally be moderate for 1 or 2 days after surgery.  If pain is severe or does not improve greatly with over the counter medications call our office.    Return for follow-up with Dr. Martinez as already scheduled (generally about 1-2 weeks after surgery).  If you do not have an appointment already or you need to change your 1-2 week appointment, please call Ayaan Guthrie at [phone] or our  at [phone]     If you are concerned about a healing problem after surgery, contact my assistant Cheyenne Sims at [phone] or our triage nurse at [phone] during standard business hours.  After hours for emergent concerns you may call the [facility] at [phone] and ask to speak with the ophthalmology resident on call.

## 2019-09-03 ENCOUNTER — OFFICE VISIT (OUTPATIENT)
Dept: OPHTHALMOLOGY | Facility: CLINIC | Age: 69
End: 2019-09-03
Attending: OPHTHALMOLOGY
Payer: COMMERCIAL

## 2019-09-03 DIAGNOSIS — Z98.890 POSTSURGICAL STATES FOLLOWING SURGERY OF EYE AND ADNEXA: Primary | ICD-10-CM

## 2019-09-03 PROCEDURE — G0463 HOSPITAL OUTPT CLINIC VISIT: HCPCS | Mod: ZF | Performed by: TECHNICIAN/TECHNOLOGIST

## 2019-09-03 ASSESSMENT — REFRACTION_WEARINGRX
OS_CYLINDER: +2.50
OD_SPHERE: +2.50
OD_CYLINDER: +2.25
OD_AXIS: 165
OS_ADD: +2.25
OD_ADD: +2.25
OS_SPHERE: +2.00
OS_AXIS: 170

## 2019-09-03 ASSESSMENT — VISUAL ACUITY
OD_CC+: -2
OS_CC+: -3
CORRECTION_TYPE: GLASSES
OD_CC: 20/15
METHOD: SNELLEN - LINEAR
OS_CC: 20/20

## 2019-09-03 NOTE — PROGRESS NOTES
1. 1 week post-op status post strabismus surgery:     -Surgery:  1. Right lateral rectus resection 7.0 mm  2. Left lateral rectus resection 7.5 mm on adjustable suture     The left lateral rectus was recessed an approximate 5 mm through a series of adjustments at which point the patient had an 8 prism diopter consecutive exophoria that the patient could fuse.    - Alignment: today patient is ortho in primary gaze at distance and near.  - Diplopia: None  - Healing up appropriately  - Maxitrol ophthalmic ointment: stop  - Start Predforte 1% four times a day in the operative eye(s) and decrease by one drop daily every week.  Course will complete in 1 month.    Return to clinic in 3 months or sooner as needed.       Complete documentation of historical and exam elements from today's encounter can be found in the full encounter summary report (not reduplicated in this progress note).  I personally obtained the chief complaint(s) and history of present illness.  I confirmed and edited as necessary the review of systems, past medical/surgical history, family history, social history, and examination findings as documented by others; and I examined the patient myself.  I personally reviewed the relevant tests, images, and reports as documented above.  I formulated and edited as necessary the assessment and plan and discussed the findings and management plan with the patient and family     Toy Martinez MD

## 2019-09-03 NOTE — NURSING NOTE
Chief Complaint(s) and History of Present Illness(es)     Post Op (Ophthalmology) Both Eyes     Laterality: both eyes    Associated symptoms: redness and eye pain.  Negative for tearing    Treatments tried: ointment and glasses    Comments: S/p RLRs 7.0 mm LLRs, 7.5 mm on adjustable suture, using ointment 3x daily, LE feels sore when moving eye, no discharge, no diplopia, no strab noticed

## 2019-09-03 NOTE — LETTER
2019    RE: Abdifatah Gold  : 1950  MRN: [id number]    Dear Providers,    I saw our mutual patient, Abdifatah Gold, in follow-up in my clinic recently.  After a thorough neuro-ophthalmic history and examination, I came to the following conclusions:     1. 1 week post-op status post strabismus surgery:     -Surgery:  1. Right lateral rectus resection 7.0 mm  2. Left lateral rectus resection 7.5 mm on adjustable suture     The left lateral rectus was recessed an approximate 5 mm through a series of adjustments at which point the patient had an 8 prism diopter consecutive exophoria that the patient could fuse.    - Alignment: today patient is ortho in primary gaze at distance and near.  - Diplopia: None  - Healing up appropriately  - Maxitrol ophthalmic ointment: stop  - Start Predforte 1% four times a day in the operative eye(s) and decrease by one drop daily every week.  Course will complete in 1 month.    Return to clinic in 3 months or sooner as needed.      For further exam details, please feel free to contact our office for additional records.  If you wish to contact me regarding this patient please email me at [email or web address] or give my clinic a call to arrange a phone conversation.    Sincerely,    Toy Martinez MD  , Neuro-Ophthalmology and Adult Strabismus Surgery  The [facility]ub[facility] in Neuro-Ophthalmology  Department of Ophthalmology and Visual Neurosciences  [facility]

## 2019-09-30 ENCOUNTER — HEALTH MAINTENANCE LETTER (OUTPATIENT)
Age: 69
End: 2019-09-30

## 2019-12-17 ENCOUNTER — OFFICE VISIT (OUTPATIENT)
Dept: OPHTHALMOLOGY | Facility: CLINIC | Age: 69
End: 2019-12-17
Attending: OPHTHALMOLOGY
Payer: COMMERCIAL

## 2019-12-17 DIAGNOSIS — H53.2 DOUBLE VISION: ICD-10-CM

## 2019-12-17 DIAGNOSIS — H50.05 ESOTROPIA, ALTERNATING: Primary | ICD-10-CM

## 2019-12-17 DIAGNOSIS — H53.10 SUBJECTIVE VISUAL DISTURBANCE: ICD-10-CM

## 2019-12-17 PROCEDURE — 92060 SENSORIMOTOR EXAMINATION: CPT | Mod: ZF | Performed by: OPHTHALMOLOGY

## 2019-12-17 PROCEDURE — G0463 HOSPITAL OUTPT CLINIC VISIT: HCPCS | Mod: ZF

## 2019-12-17 ASSESSMENT — CUP TO DISC RATIO
OD_RATIO: 0.25
OS_RATIO: 0.25

## 2019-12-17 ASSESSMENT — TONOMETRY
IOP_METHOD: ICARE
OS_IOP_MMHG: 14
OD_IOP_MMHG: 11

## 2019-12-17 ASSESSMENT — VISUAL ACUITY
METHOD: SNELLEN - LINEAR
OS_CC: 20/25
CORRECTION_TYPE: GLASSES
OS_CC+: +2
OD_CC: 20/15

## 2019-12-17 ASSESSMENT — CONF VISUAL FIELD
METHOD: COUNTING FINGERS
OD_NORMAL: 1
OS_NORMAL: 1

## 2019-12-17 ASSESSMENT — REFRACTION_WEARINGRX
OD_SPHERE: +2.50
OD_ADD: +2.25
OS_SPHERE: +2.00
OS_ADD: +2.25
OD_CYLINDER: +2.25
OS_AXIS: 170
OS_CYLINDER: +2.50
OD_AXIS: 165

## 2019-12-17 ASSESSMENT — REFRACTION_MANIFEST
OS_SPHERE: +2.00
OS_CYLINDER: +3.00
OS_AXIS: 170

## 2019-12-17 ASSESSMENT — REFRACTION_FINALRX
OD_HBASE: OUT
OS_HBASE: OUT
OD_HPRISM: 2.0
OS_HPRISM: 2.0

## 2019-12-17 ASSESSMENT — EXTERNAL EXAM - LEFT EYE: OS_EXAM: NORMAL

## 2019-12-17 ASSESSMENT — EXTERNAL EXAM - RIGHT EYE: OD_EXAM: NORMAL

## 2019-12-17 NOTE — LETTER
2019    RE: Abdifatah Gold  : 1950  MRN: [id number]    Dear Providers,    I saw our mutual patient, Abdifatah Gold, in follow-up in my clinic recently.  After a thorough neuro-ophthalmic history and examination, I came to the following conclusions:     1. 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 New prism glasses given today.    Abdifatah Gold is a 68 year old male who [facility] for progressive binocular horizontal diplopia due to esotropia on 2019. Onset was . Worse at distance than near and worse later in day/when tired. Has had increasing prism requirements, currently tolerating 18 total LEOBARDO (9 each eye) in current spectacles. He is now 4 months status-post strabismus surgery on 19:     -Surgery:  1. Right lateral rectus resection 7.0 mm  2. Left lateral rectus resection 7.5 mm on adjustable suture     The left lateral rectus was recessed an approximate 5 mm through a series of adjustments at which point the patient had an 8 prism diopter consecutive exophoria that the patient could fuse.    He presents today for 4 month post-surgical follow up. He reports that he recovered well from surgery. However, he is experiencing mild progression of diplopia at distance, especially noticeable over the past 2 weeks. He reports that diplopia is constant, manageable in the morning but worse through the day and especially when he is tired. He does not have an diplopia while reading. No eye pain, pain with eye movements, change in visual acuity. He does report worsening of haloes/glare around oncoming headlights.     On exam, patient is 20/15 in the RIGHT eye and 20/25+ in the LEFT eye. With refraction, the LEFT eye corrects to 20/20+2. Each eye has briskly reactive pupils without RAPD. IOP, CVF, and color plates all normal. On motility examination today, patient demonstrations very  slight deficits in abduction each eye. There is a intermittent 4 prism diopter esotropia on primary gaze, present nearly always. He demonstrates a 2 prism diopter esophoria in upgaze and a 6 prism diopter esotropia in downgaze. He has a very slight LEFT hyperphoria in left gaze and a very mild esophoria in right gaze. On my examination, he prefers 4 prism diopters of base-out prism for distance correction, but tolerates up to 10 prism diopters. Anterior segment exam is notable for a deep sulcus and mild cataracts bilaterally. Posterior segment exam is unremarkable.  In summary, Mr. Gold is 4 months status-post eye muscle surgery as above for sagging eye syndrome. He has a residual esotropia today which can occur with surgical regression over time. At this time, I will give the patient a new manifest refraction with ground in prism totaling 4 prism diopter base out divided equally in both lenses.    I did not make a follow-up appointment, but I would be happy to see the patient back in the future should any new neuro-ophthalmic concern arise including worsening diplopia.      For further exam details, please feel free to contact our office for additional records.  If you wish to contact me regarding this patient please email me at [email or web address] or give my clinic a call to arrange a phone conversation.    Sincerely,    Toy Martinez MD  , Neuro-Ophthalmology and Adult Strabismus Surgery  The [facility] Stubbs Chair in Neuro-Ophthalmology  Department of Ophthalmology and Visual Neurosciences  [facility]

## 2019-12-17 NOTE — PROGRESS NOTES
1. 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 New prism glasses given today.    Abdifatah Gold is a 68 year old male who [facility] for progressive binocular horizontal diplopia due to esotropia on 6/26/2019. Onset was 2011. Worse at distance than near and worse later in day/when tired. Has had increasing prism requirements, currently tolerating 18 total LEOBARDO (9 each eye) in current spectacles. He is now 4 months status-post strabismus surgery on 8/19/19:     -Surgery:  1. Right lateral rectus resection 7.0 mm  2. Left lateral rectus resection 7.5 mm on adjustable suture     The left lateral rectus was recessed an approximate 5 mm through a series of adjustments at which point the patient had an 8 prism diopter consecutive exophoria that the patient could fuse.    He presents today for 4 month post-surgical follow up. He reports that he recovered well from surgery. However, he is experiencing mild progression of diplopia at distance, especially noticeable over the past 2 weeks. He reports that diplopia is constant, manageable in the morning but worse through the day and especially when he is tired. He does not have an diplopia while reading. No eye pain, pain with eye movements, change in visual acuity. He does report worsening of haloes/glare around oncoming headlights.     On exam, patient is 20/15 in the RIGHT eye and 20/25+ in the LEFT eye. With refraction, the LEFT eye corrects to 20/20+2. Each eye has briskly reactive pupils without RAPD. IOP, CVF, and color plates all normal. On motility examination today, patient demonstrations very slight deficits in abduction each eye. There is a intermittent 4 prism diopter esotropia on primary gaze, present nearly always. He demonstrates a 2 prism diopter esophoria in upgaze and a 6 prism diopter esotropia in downgaze. He has a very slight LEFT hyperphoria in left  gaze and a very mild esophoria in right gaze. On my examination, he prefers 4 prism diopters of base-out prism for distance correction, but tolerates up to 10 prism diopters. Anterior segment exam is notable for a deep sulcus and mild cataracts bilaterally. Posterior segment exam is unremarkable.    In summary, Mr. Gold is 4 months status-post eye muscle surgery as above for sagging eye syndrome. He has a residual esotropia today which can occur with surgical regression over time. At this time, I will give the patient a new manifest refraction with ground in prism totaling 4 prism diopter base out divided equally in both lenses.    I did not make a follow-up appointment, but I would be happy to see the patient back in the future should any new neuro-ophthalmic concern arise including worsening diplopia.     Complete documentation of historical and exam elements from today's encounter can be found in the full encounter summary report (not reduplicated in this progress note).  I personally obtained the chief complaint(s) and history of present illness.  I confirmed and edited as necessary the review of systems, past medical/surgical history, family history, social history, and examination findings as documented by others; and I examined the patient myself.  I personally reviewed the relevant tests, images, and reports as documented above.  I formulated and edited as necessary the assessment and plan and discussed the findings and management plan with the patient and family.  I personally reviewed the ophthalmic test(s) associated with this encounter, agree with the interpretation(s) as documented by the resident/fellow, and have edited the corresponding report(s) as necessary.     MD RANDELL House MD  PGY3 Ophthalmology Resident

## 2019-12-17 NOTE — NURSING NOTE
Chief Complaint(s) and History of Present Illness(es)     Diplopia Follow-Up     Laterality: both eyes    Quality: horizontal    Frequency: intermittently    Timing: in the evening and when looking in the distance    Course: gradually worsening    Associated symptoms: blurred vision.  Negative for headaches, droopy eyelid and eye pain    Treatments tried: surgery              Comments     Noting increased horz diplopia since surgery, worse when tired and at distance. Throughout day can control well. Headlights on cars have halos/stars since surgery and hasn't resolved.

## 2020-03-15 ENCOUNTER — HEALTH MAINTENANCE LETTER (OUTPATIENT)
Age: 70
End: 2020-03-15

## 2021-01-15 ENCOUNTER — HEALTH MAINTENANCE LETTER (OUTPATIENT)
Age: 71
End: 2021-01-15

## 2021-05-09 ENCOUNTER — HEALTH MAINTENANCE LETTER (OUTPATIENT)
Age: 71
End: 2021-05-09

## 2021-10-24 ENCOUNTER — HEALTH MAINTENANCE LETTER (OUTPATIENT)
Age: 71
End: 2021-10-24

## 2022-06-05 ENCOUNTER — HEALTH MAINTENANCE LETTER (OUTPATIENT)
Age: 72
End: 2022-06-05

## 2022-10-15 ENCOUNTER — HEALTH MAINTENANCE LETTER (OUTPATIENT)
Age: 72
End: 2022-10-15

## 2023-06-11 ENCOUNTER — HEALTH MAINTENANCE LETTER (OUTPATIENT)
Age: 73
End: 2023-06-11

## (undated) DEVICE — DRAPE STERI TOWEL LG 1010

## (undated) DEVICE — PACK MINOR EYE CUSTOM ASC

## (undated) DEVICE — SU VICRYL 6-0 S-29 12" J556G

## (undated) DEVICE — SU VICRYL 6-0 S-14DA 18" UND J670G

## (undated) DEVICE — EYE PREP BETADINE 5% SOLUTION 30ML 0065-0411-30

## (undated) DEVICE — SU PLAIN 6-0 G-1DA 18" 770G

## (undated) DEVICE — GLOVE PROTEXIS MICRO 7.5  2D73PM75

## (undated) DEVICE — SOL WATER IRRIG 500ML BOTTLE 2F7113

## (undated) DEVICE — ESU CORD BIPOLAR GREEN 10-4000

## (undated) DEVICE — LINEN TOWEL PACK X5 5464

## (undated) DEVICE — EYE MARKING PAD 581057

## (undated) DEVICE — DRSG STERI STRIP 1/2X4" R1547

## (undated) RX ORDER — OXYMETAZOLINE HYDROCHLORIDE 0.05 G/100ML
SPRAY NASAL
Status: DISPENSED
Start: 2019-08-19

## (undated) RX ORDER — ACETAMINOPHEN 325 MG/1
TABLET ORAL
Status: DISPENSED
Start: 2019-08-19

## (undated) RX ORDER — TETRACAINE HYDROCHLORIDE 5 MG/ML
SOLUTION OPHTHALMIC
Status: DISPENSED
Start: 2019-08-19

## (undated) RX ORDER — BALANCED SALT SOLUTION 6.4; .75; .48; .3; 3.9; 1.7 MG/ML; MG/ML; MG/ML; MG/ML; MG/ML; MG/ML
SOLUTION OPHTHALMIC
Status: DISPENSED
Start: 2019-08-19

## (undated) RX ORDER — PROPOFOL 10 MG/ML
INJECTION, EMULSION INTRAVENOUS
Status: DISPENSED
Start: 2019-08-19

## (undated) RX ORDER — FENTANYL CITRATE 50 UG/ML
INJECTION, SOLUTION INTRAMUSCULAR; INTRAVENOUS
Status: DISPENSED
Start: 2019-08-19

## (undated) RX ORDER — GLYCOPYRROLATE 0.2 MG/ML
INJECTION INTRAMUSCULAR; INTRAVENOUS
Status: DISPENSED
Start: 2019-08-19

## (undated) RX ORDER — LIDOCAINE HYDROCHLORIDE 20 MG/ML
INJECTION, SOLUTION EPIDURAL; INFILTRATION; INTRACAUDAL; PERINEURAL
Status: DISPENSED
Start: 2019-08-19

## (undated) RX ORDER — GABAPENTIN 100 MG/1
CAPSULE ORAL
Status: DISPENSED
Start: 2019-08-19

## (undated) RX ORDER — DEXAMETHASONE SODIUM PHOSPHATE 4 MG/ML
INJECTION, SOLUTION INTRA-ARTICULAR; INTRALESIONAL; INTRAMUSCULAR; INTRAVENOUS; SOFT TISSUE
Status: DISPENSED
Start: 2019-08-19

## (undated) RX ORDER — ONDANSETRON 2 MG/ML
INJECTION INTRAMUSCULAR; INTRAVENOUS
Status: DISPENSED
Start: 2019-08-19